# Patient Record
Sex: FEMALE | Race: WHITE | NOT HISPANIC OR LATINO
[De-identification: names, ages, dates, MRNs, and addresses within clinical notes are randomized per-mention and may not be internally consistent; named-entity substitution may affect disease eponyms.]

---

## 2017-05-21 ENCOUNTER — TRANSCRIPTION ENCOUNTER (OUTPATIENT)
Age: 27
End: 2017-05-21

## 2019-06-24 PROBLEM — Z00.00 ENCOUNTER FOR PREVENTIVE HEALTH EXAMINATION: Status: ACTIVE | Noted: 2019-06-24

## 2019-07-22 ENCOUNTER — APPOINTMENT (OUTPATIENT)
Dept: COLORECTAL SURGERY | Facility: CLINIC | Age: 29
End: 2019-07-22
Payer: COMMERCIAL

## 2019-07-22 VITALS
HEIGHT: 64 IN | DIASTOLIC BLOOD PRESSURE: 98 MMHG | TEMPERATURE: 98.2 F | HEART RATE: 90 BPM | BODY MASS INDEX: 42.68 KG/M2 | WEIGHT: 250 LBS | OXYGEN SATURATION: 98 % | SYSTOLIC BLOOD PRESSURE: 149 MMHG

## 2019-07-22 DIAGNOSIS — Z80.9 FAMILY HISTORY OF MALIGNANT NEOPLASM, UNSPECIFIED: ICD-10-CM

## 2019-07-22 DIAGNOSIS — Z84.1 FAMILY HISTORY OF DISORDERS OF KIDNEY AND URETER: ICD-10-CM

## 2019-07-22 DIAGNOSIS — Z78.9 OTHER SPECIFIED HEALTH STATUS: ICD-10-CM

## 2019-07-22 DIAGNOSIS — Z83.3 FAMILY HISTORY OF DIABETES MELLITUS: ICD-10-CM

## 2019-07-22 DIAGNOSIS — Z82.49 FAMILY HISTORY OF ISCHEMIC HEART DISEASE AND OTHER DISEASES OF THE CIRCULATORY SYSTEM: ICD-10-CM

## 2019-07-22 DIAGNOSIS — B00.9 HERPESVIRAL INFECTION, UNSPECIFIED: ICD-10-CM

## 2019-07-22 DIAGNOSIS — S06.9X9A UNSPECIFIED INTRACRANIAL INJURY WITH LOSS OF CONSCIOUSNESS OF UNSPECIFIED DURATION, INITIAL ENCOUNTER: ICD-10-CM

## 2019-07-22 PROCEDURE — 46600 DIAGNOSTIC ANOSCOPY SPX: CPT

## 2019-07-22 PROCEDURE — 99203 OFFICE O/P NEW LOW 30 MIN: CPT | Mod: 25

## 2019-07-22 NOTE — CONSULT LETTER
[Consult Letter:] : I had the pleasure of evaluating your patient, [unfilled]. [Dear  ___] : Dear  [unfilled], [( Thank you for referring [unfilled] for consultation for _____ )] : Thank you for referring [unfilled] for consultation for [unfilled] [Please see my note below.] : Please see my note below. [Sincerely,] : Sincerely, [Consult Closing:] : Thank you very much for allowing me to participate in the care of this patient.  If you have any questions, please do not hesitate to contact me. [FreeTextEntry2] :  Dr.Daniel Bebe RIVERO\par [address] [place] [address] [address]\par [place] [id number] [FreeTextEntry3] : Mary Grace Rivers MD

## 2019-07-22 NOTE — PHYSICAL EXAM
[FreeTextEntry1] : Medical assistant present for duration of physical examination\par \par Gen NAD, AOx3\par Abdomen soft\par Anorectal Exam:\par Inspection no erythema, induration or fluctuance, no skin excoriation, left posterior quadrant external opening consistent with fistula tract seen on MRI - probed with fistula probe that fell easily into fistula tract \par TO nontender, no masses palpated, no blood on gloved finger, good tone, good squeeze\par Anoscopy posterior midline fissure, nonfriable internal hemorrhoids\par

## 2019-07-22 NOTE — HISTORY OF PRESENT ILLNESS
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 Also reports a sticky bloody/purulent drainage. Denies fevers. On evaluation by Dr Spence patient was noted to have a draining fistula tract as well as a posterior midline fistula\par \par MRI pelvis completed 6/21/19 - \par "The lower anal ring at approximately 4:00 demonstrates a short fistulous tract extending posteriorly and medially to the perianal skin over a length of 2 cm"\par "IMPRESSION: A short fistulous tract extends from the lower ring of the anal canal and a proximally 4:00, to the posterior lateral perianal skin. No evidence of abscess"\par \par Moves bowels daily, some episodes of constipation managed with miralax prn\par Denies fecal incontinence to gas or solid/liquids stools\par Denies family/personal history of CRC or IBD\par

## 2019-07-22 NOTE — ASSESSMENT
[FreeTextEntry1] : Exam findings and diagnosis were discussed at length with patient.\par I recommend the patient consider EUA, fissurectomy/fistulotomy, possible seton placement\par The nature of the procedure as well as risks and benefits were reviewed with the patient. Risks discussed included but were not limited to pain, bleeding, infection, recurrence of symptoms, need for future surgery, and risk of alteration of bowel habits, such as seepage, fecal urgency and/or fecal (gas, liquid or solid) incontinence discussed, which may be temporary or permanent. The preoperative, intraoperative, and postoperative management were discussed with the patient in detail. All questions were answered. The patient is in agreement with the proposed surgery and we will proceed with surgical planning.\par \par

## 2019-08-06 ENCOUNTER — TRANSCRIPTION ENCOUNTER (OUTPATIENT)
Age: 29
End: 2019-08-06

## 2019-09-20 ENCOUNTER — APPOINTMENT (OUTPATIENT)
Dept: COLORECTAL SURGERY | Facility: CLINIC | Age: 29
End: 2019-09-20

## 2019-09-20 ENCOUNTER — RESULT REVIEW (OUTPATIENT)
Age: 29
End: 2019-09-20

## 2019-09-20 ENCOUNTER — OUTPATIENT (OUTPATIENT)
Dept: OUTPATIENT SERVICES | Facility: HOSPITAL | Age: 29
LOS: 1 days | Discharge: HOME | End: 2019-09-20
Payer: COMMERCIAL

## 2019-09-20 PROCEDURE — 46200 REMOVAL OF ANAL FISSURE: CPT | Mod: GC

## 2019-09-20 PROCEDURE — 46275 REMOVE ANAL FIST INTER: CPT | Mod: GC

## 2019-09-27 LAB — SURGICAL PATHOLOGY STUDY: SIGNIFICANT CHANGE UP

## 2019-10-17 ENCOUNTER — APPOINTMENT (OUTPATIENT)
Dept: COLORECTAL SURGERY | Facility: CLINIC | Age: 29
End: 2019-10-17
Payer: COMMERCIAL

## 2019-10-17 VITALS
TEMPERATURE: 98.1 F | HEART RATE: 94 BPM | SYSTOLIC BLOOD PRESSURE: 131 MMHG | BODY MASS INDEX: 42.34 KG/M2 | DIASTOLIC BLOOD PRESSURE: 91 MMHG | HEIGHT: 64 IN | WEIGHT: 248 LBS

## 2019-10-17 PROCEDURE — 99024 POSTOP FOLLOW-UP VISIT: CPT

## 2019-10-21 NOTE — PROCEDURE
[FreeTextEntry1] : left posterior quadrant perianal wound bed gently debrided with silver nitrate chemical cauterization, patient tolerated well

## 2019-10-21 NOTE — CONSULT LETTER
[Dear  ___] : Dear  [unfilled], [Consult Letter:] : I had the pleasure of evaluating your patient, [unfilled]. [Please see my note below.] : Please see my note below. [( Thank you for referring [unfilled] for consultation for _____ )] : Thank you for referring [unfilled] for consultation for [unfilled] [Sincerely,] : Sincerely, [Consult Closing:] : Thank you very much for allowing me to participate in the care of this patient.  If you have any questions, please do not hesitate to contact me. [FreeTextEntry2] : EVANGELIST ARRIAZA\par [address]  [address] \[place] [address]\par  [FreeTextEntry3] : DEJAN VELA MD\par

## 2019-10-21 NOTE — HISTORY OF PRESENT ILLNESS
[FreeTextEntry1] : 28 yo F presents for f/u left posterior anal fissure and superficial fistula, s/p fissurectomy and fistulotomy on 9/20/19\par \par c/o occasional mucus and feeling of wetness after BMs, occasionally streaked w/ stool\par Denies pain, itching\par Reports some straining w/ BM and noted streak of red blood on TP\par Denies fecal incontinence\par hx of constipation and harder stools, patient has been eating more dietary fiber and has increased water intake w/ improvement\par Has been taking Miralax every other day to daily to help with constipation\par \par Surgical Final Report\par Final Diagnosis\par Fissure and fistula; fistulectomy:\par - Squamous and underlying fibroconnective tissue with marked\par acute and chronic inflammation, granulation\par tissue, consistent with anal abscess and fistula tract\par

## 2019-10-21 NOTE — ASSESSMENT
[FreeTextEntry1] : Recovering well, patient reassured\par Continue high fiber diet, adequate oral hydration, stool softeners and sitz baths as needed\par Avoid constipation and straining\par Over the counter pain control prn\par Continue local wound care\par F/u in 1 month or sooner as needed\par All questions were answered, patient expressed understanding, and is agreeable to this plan.\par

## 2019-10-21 NOTE — PHYSICAL EXAM
[FreeTextEntry1] :  Medical assistant present for duration of physical examination\par \par Gen NAD, AOx3\par \par Anorectal Exam:\par Inspection no erythema, induration or fluctuance, left posterior quadrant fistulotomy/fissurectomy site well healing with granulation, exposed vicryl stitch knot removed, \par TO nontender, no masses palpated, no blood on gloved finger, good tone\par

## 2019-11-14 ENCOUNTER — APPOINTMENT (OUTPATIENT)
Dept: COLORECTAL SURGERY | Facility: CLINIC | Age: 29
End: 2019-11-14
Payer: COMMERCIAL

## 2019-11-14 VITALS
WEIGHT: 246 LBS | HEART RATE: 67 BPM | TEMPERATURE: 97.6 F | BODY MASS INDEX: 42 KG/M2 | DIASTOLIC BLOOD PRESSURE: 85 MMHG | SYSTOLIC BLOOD PRESSURE: 134 MMHG | HEIGHT: 64 IN

## 2019-11-14 DIAGNOSIS — K60.3 ANAL FISSURE, UNSPECIFIED: ICD-10-CM

## 2019-11-14 DIAGNOSIS — K60.2 ANAL FISSURE, UNSPECIFIED: ICD-10-CM

## 2019-11-14 PROCEDURE — 99024 POSTOP FOLLOW-UP VISIT: CPT

## 2019-11-14 PROCEDURE — 46600 DIAGNOSTIC ANOSCOPY SPX: CPT | Mod: 79

## 2019-11-14 NOTE — CONSULT LETTER
[Dear  ___] : Dear  [unfilled], [Consult Letter:] : I had the pleasure of evaluating your patient, [unfilled]. [Please see my note below.] : Please see my note below. [( Thank you for referring [unfilled] for consultation for _____ )] : Thank you for referring [unfilled] for consultation for [unfilled] [Sincerely,] : Sincerely, [Consult Closing:] : Thank you very much for allowing me to participate in the care of this patient.  If you have any questions, please do not hesitate to contact me. [FreeTextEntry2] : EVANGELIST ARRIAZA\par [address]  \[place] [address]\par  [FreeTextEntry3] : DEJAN VELA MD

## 2019-11-14 NOTE — PHYSICAL EXAM
[FreeTextEntry1] :  Medical assistant present for duration of physical examination\par \par Gen NAD, AOx3\par \par Anorectal Exam:\par Inspection no erythema, induration or fluctuance, left posterior quadrant fistulotomy/fissurectomy site well healing\par TO nontender, no masses palpated, no blood on gloved finger, good tone\par Anoscopy well healed left posterior quadrant surgical site

## 2019-11-14 NOTE — ASSESSMENT
[FreeTextEntry1] : Patient reassured\par Recovered well from anorectal procedure\par Continue bowel regimen\par F/u in symptoms return or new symptoms arise\par All questions were answered, patient expressed understanding, and is agreeable to this plan.\par

## 2022-10-25 NOTE — HISTORY OF PRESENT ILLNESS
Patient tolerated transfusion well. No complications. Vitals stable throughout.  Discharged ambulatory
[FreeTextEntry1] : 28 yo F presents for f/u left posterior anal fissure and superficial fistula, s/p fissurectomy and fistulotomy on 9/20/19\par Last seen 10/17/19, exposed vicryl stitch removed, left posterior quadrant wound bed debrided w/ silver nitrate. Pt presents for 1 month follow up\par Pt reports she is well, denies pain\par Pt reports she sometimes forgets to drink water, experienced scant BRBPR noted on TP twice after passed hard stool.\par Of note, had a "stomach bug" two days ago w/ several episodes of diarrhea, denies pain or irritation w/ BMs at this time.\par Denies pain, itching or burning\par BH: 1-2 times daily, denies straining\par Typically has adequate fiber and water\par Stopped taking Miralax as BMs are regular